# Patient Record
Sex: FEMALE | Race: OTHER | NOT HISPANIC OR LATINO
[De-identification: names, ages, dates, MRNs, and addresses within clinical notes are randomized per-mention and may not be internally consistent; named-entity substitution may affect disease eponyms.]

---

## 2018-06-04 ENCOUNTER — APPOINTMENT (OUTPATIENT)
Dept: GASTROENTEROLOGY | Facility: CLINIC | Age: 23
End: 2018-06-04
Payer: COMMERCIAL

## 2018-06-04 VITALS
WEIGHT: 125 LBS | BODY MASS INDEX: 20.09 KG/M2 | SYSTOLIC BLOOD PRESSURE: 102 MMHG | OXYGEN SATURATION: 99 % | HEART RATE: 90 BPM | HEIGHT: 66 IN | DIASTOLIC BLOOD PRESSURE: 70 MMHG

## 2018-06-04 DIAGNOSIS — Z78.9 OTHER SPECIFIED HEALTH STATUS: ICD-10-CM

## 2018-06-04 DIAGNOSIS — R10.9 UNSPECIFIED ABDOMINAL PAIN: ICD-10-CM

## 2018-06-04 DIAGNOSIS — R11.0 NAUSEA: ICD-10-CM

## 2018-06-04 PROCEDURE — 99204 OFFICE O/P NEW MOD 45 MIN: CPT

## 2018-06-05 LAB
ALBUMIN SERPL ELPH-MCNC: 4.6 G/DL
ALP BLD-CCNC: 46 U/L
ALT SERPL-CCNC: 15 U/L
ANION GAP SERPL CALC-SCNC: 12 MMOL/L
AST SERPL-CCNC: 29 U/L
BASOPHILS # BLD AUTO: 0.01 K/UL
BASOPHILS NFR BLD AUTO: 0.1 %
BILIRUB SERPL-MCNC: 0.2 MG/DL
BUN SERPL-MCNC: 15 MG/DL
CALCIUM SERPL-MCNC: 9.4 MG/DL
CHLORIDE SERPL-SCNC: 103 MMOL/L
CO2 SERPL-SCNC: 25 MMOL/L
CREAT SERPL-MCNC: 0.87 MG/DL
EOSINOPHIL # BLD AUTO: 0.16 K/UL
EOSINOPHIL NFR BLD AUTO: 2.2 %
GLUCOSE SERPL-MCNC: 89 MG/DL
HCT VFR BLD CALC: 41.8 %
HGB BLD-MCNC: 13.1 G/DL
IMM GRANULOCYTES NFR BLD AUTO: 0.1 %
LYMPHOCYTES # BLD AUTO: 2.22 K/UL
LYMPHOCYTES NFR BLD AUTO: 30.7 %
MAN DIFF?: NORMAL
MCHC RBC-ENTMCNC: 26.7 PG
MCHC RBC-ENTMCNC: 31.3 GM/DL
MCV RBC AUTO: 85.1 FL
MONOCYTES # BLD AUTO: 0.67 K/UL
MONOCYTES NFR BLD AUTO: 9.3 %
NEUTROPHILS # BLD AUTO: 4.16 K/UL
NEUTROPHILS NFR BLD AUTO: 57.6 %
PLATELET # BLD AUTO: 218 K/UL
POTASSIUM SERPL-SCNC: 4.1 MMOL/L
PROT SERPL-MCNC: 7.6 G/DL
RBC # BLD: 4.91 M/UL
RBC # FLD: 13.9 %
SODIUM SERPL-SCNC: 140 MMOL/L
TSH SERPL-ACNC: 3.04 UIU/ML
TTG IGA SER IA-ACNC: <5 UNITS
TTG IGA SER-ACNC: NEGATIVE
TTG IGG SER IA-ACNC: <5 UNITS
TTG IGG SER IA-ACNC: NEGATIVE
WBC # FLD AUTO: 7.23 K/UL

## 2018-06-06 LAB
ENDOMYSIUM IGA SER QL: NEGATIVE
ENDOMYSIUM IGA TITR SER: NORMAL

## 2019-03-16 ENCOUNTER — EMERGENCY (EMERGENCY)
Facility: HOSPITAL | Age: 24
LOS: 1 days | Discharge: HOME | End: 2019-03-16
Attending: EMERGENCY MEDICINE
Payer: COMMERCIAL

## 2019-03-16 VITALS
OXYGEN SATURATION: 98 % | DIASTOLIC BLOOD PRESSURE: 81 MMHG | HEIGHT: 63 IN | SYSTOLIC BLOOD PRESSURE: 115 MMHG | WEIGHT: 110.01 LBS | TEMPERATURE: 98 F | RESPIRATION RATE: 16 BRPM | HEART RATE: 78 BPM

## 2019-03-16 PROCEDURE — 99282 EMERGENCY DEPT VISIT SF MDM: CPT

## 2019-03-16 PROCEDURE — 99053 MED SERV 10PM-8AM 24 HR FAC: CPT

## 2019-03-16 PROCEDURE — 99282 EMERGENCY DEPT VISIT SF MDM: CPT | Mod: 25

## 2019-03-16 NOTE — ED PROVIDER NOTE - ATTENDING CONTRIBUTION TO CARE
ATTENDING MD:  I, Jamir Salguero, personally have seen and examined this patient.  I have discussed all aspects of care with the resident physician. Resident note reviewed and agree on plan of care and except where noted.  See HPI, PE, and MDM for details.    well appearing, NAD. Normal heart rate. normal respiratory rate and effort. L-hand wound is C/D/I.     MDM: screening labs drawn for patient. Declining PEP at this time. Her rapid HIV neg. Source patient results pending. Asking to return to work pending results. Will start PEP if source positive. Lab says they will call me with results.

## 2019-03-16 NOTE — ED PROVIDER NOTE - OBJECTIVE STATEMENT
23F no pmh UTD with vaccines p/w finger stick. Patient is MICU nurse. Accidentally stuck her R first finger with butterfly needle while doing ABG. Unknown infectious status of source patient. Feels well otherwise. Does not want PEP

## 2019-03-16 NOTE — ED ADULT NURSE NOTE - OBJECTIVE STATEMENT
Patient presented to ED after a needle stick. 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 Patient had a pinpoint  puncture and stated she washed her hands throughout.

## 2019-03-16 NOTE — ED PROVIDER NOTE - SKIN, MLM
Skin normal color for race, warm, dry and intact. No evidence of rash. non bleeding, small needle injury to R first finger. no obvious local infectious signs.

## 2019-03-16 NOTE — ED ADULT NURSE NOTE - NSIMPLEMENTINTERV_GEN_ALL_ED
Implemented All Universal Safety Interventions:  [place] to call system. Call bell, personal items and telephone within reach. Instruct patient to call for assistance. Room bathroom lighting operational. Non-slip footwear when patient is off stretcher. Physically safe environment: no spills, clutter or unnecessary equipment. Stretcher in lowest position, wheels locked, appropriate side rails in place.

## 2019-03-16 NOTE — ED PROVIDER NOTE - CLINICAL SUMMARY MEDICAL DECISION MAKING FREE TEXT BOX
23F no pmh p/w needle stick. utd with vaccinations. does not want PEP. Will follow exposure protocol and have patient f/u with employee health.

## 2019-05-09 ENCOUNTER — RESULT REVIEW (OUTPATIENT)
Age: 24
End: 2019-05-09

## 2019-06-05 ENCOUNTER — RX RENEWAL (OUTPATIENT)
Age: 24
End: 2019-06-05

## 2019-09-06 ENCOUNTER — MEDICATION RENEWAL (OUTPATIENT)
Age: 24
End: 2019-09-06

## 2019-09-18 ENCOUNTER — MEDICATION RENEWAL (OUTPATIENT)
Age: 24
End: 2019-09-18

## 2019-09-25 ENCOUNTER — MEDICATION RENEWAL (OUTPATIENT)
Age: 24
End: 2019-09-25

## 2020-04-25 ENCOUNTER — MESSAGE (OUTPATIENT)
Age: 25
End: 2020-04-25

## 2020-05-03 LAB
SARS-COV-2 IGG SERPL IA-ACNC: <0.1 INDEX
SARS-COV-2 IGG SERPL QL IA: NEGATIVE

## 2020-09-16 ENCOUNTER — RX RENEWAL (OUTPATIENT)
Age: 25
End: 2020-09-16

## 2020-11-05 ENCOUNTER — NON-APPOINTMENT (OUTPATIENT)
Age: 25
End: 2020-11-05

## 2021-03-10 ENCOUNTER — APPOINTMENT (OUTPATIENT)
Dept: GASTROENTEROLOGY | Facility: CLINIC | Age: 26
End: 2021-03-10
Payer: COMMERCIAL

## 2021-03-10 VITALS
BODY MASS INDEX: 20.57 KG/M2 | HEIGHT: 66 IN | TEMPERATURE: 98.7 F | DIASTOLIC BLOOD PRESSURE: 70 MMHG | OXYGEN SATURATION: 99 % | WEIGHT: 128 LBS | SYSTOLIC BLOOD PRESSURE: 110 MMHG | HEART RATE: 88 BPM

## 2021-03-10 PROCEDURE — 99213 OFFICE O/P EST LOW 20 MIN: CPT

## 2021-03-10 PROCEDURE — 99072 ADDL SUPL MATRL&STAF TM PHE: CPT

## 2021-03-10 NOTE — ASSESSMENT
[FreeTextEntry1] : This is a 25-year-old female with history of chronic GERD and irritable bowel syndrome.  She states that ever since she modified her diet eating less acidic, spicy and caffeinated foods and beverages her heartburn and IBS has improved.  She was able to come off omeprazole and is taking over-the-counter Pepcid 20 mg as needed with good response.  She denies dysphagia or odynophagia.  She hardly has abdominal pain or changes in bowel habits since she modified her diet.  She states that she also has less stress at work.  Overall she feels well.  I recommend famotidine 20 mg as needed for GERD symptoms.  I recommend eating small meals and avoid laying down after eating.  She is to call me if she has any questions or concerns otherwise I will see her for follow-up in 1 year.

## 2021-05-24 ENCOUNTER — APPOINTMENT (OUTPATIENT)
Dept: INTERNAL MEDICINE | Facility: CLINIC | Age: 26
End: 2021-05-24
Payer: MEDICARE

## 2021-05-24 VITALS
WEIGHT: 133 LBS | DIASTOLIC BLOOD PRESSURE: 70 MMHG | BODY MASS INDEX: 22.16 KG/M2 | HEIGHT: 65 IN | SYSTOLIC BLOOD PRESSURE: 108 MMHG | HEART RATE: 88 BPM | OXYGEN SATURATION: 97 %

## 2021-05-24 DIAGNOSIS — R00.0 TACHYCARDIA, UNSPECIFIED: ICD-10-CM

## 2021-05-24 DIAGNOSIS — Z83.49 FAMILY HISTORY OF OTHER ENDOCRINE, NUTRITIONAL AND METABOLIC DISEASES: ICD-10-CM

## 2021-05-24 DIAGNOSIS — L81.8 OTHER SPECIFIED DISORDERS OF PIGMENTATION: ICD-10-CM

## 2021-05-24 PROCEDURE — G0444 DEPRESSION SCREEN ANNUAL: CPT | Mod: 59

## 2021-05-24 PROCEDURE — 99385 PREV VISIT NEW AGE 18-39: CPT | Mod: GY

## 2021-05-24 PROCEDURE — 99072 ADDL SUPL MATRL&STAF TM PHE: CPT

## 2021-05-24 RX ORDER — MULTIVITAMIN
CAPSULE ORAL
Refills: 0 | Status: ACTIVE | COMMUNITY

## 2021-05-24 RX ORDER — ACETAMINOPHEN 325 MG
TABLET ORAL
Refills: 0 | Status: ACTIVE | COMMUNITY

## 2021-05-24 NOTE — ASSESSMENT
[FreeTextEntry1] : 1.  General health maintenance -reports that Pap smear is up-to-date.  Immunizations are up-to-date.  Uses sunscreen, seatbelts etc.\par 2.  History of anemia and complaints of easy bruising but only with trauma.  We will check a CBC.\par 3.  History of GERD -has seen GI in the past.  Uses famotidine and when it is bad will then take a PPI for 3 to 4 days.\par 4.  Complaints of hair loss -tug test was negative.  Has been under a great deal of stress with the pandemic and she is an ICU nurse and works nights as well.  Suspect stress related but will check to make sure she is not anemic or her thyroid is functioning correctly.\par 5.  Labs as per plan\par 6.  Complaints of fast heart rate with exercise or walking.  Has been following and it is actually been a little better.  Suggested that she stay well-hydrated.  Will check TFTs to rule out any abnormality.\par 7.  Follow-up in 1 year or as needed

## 2021-05-24 NOTE — HISTORY OF PRESENT ILLNESS
[de-identified] : Healthy 24 yo female here to [facility].  Has no specific complaints.  Tends to run a high heart rate with exercise - can sometimes go to 190.  She has been paying attention to this as she has an apple watch.  Walking her heart rate can go into the 120'a.  Tends to not drink enough fluids.  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  She als has a h/o anemia.  Sees GYN and usually gets her CBC done but does not know the number\par \par Bruises easily with minor trauma but she reports no spontaneous bruising.  Periods are heavy the first 2 days - can go through 6 super tampons in a day.\par \par Had Pfizer back in Dec and Jan - the latter on 1/12

## 2021-05-24 NOTE — PHYSICAL EXAM
[No Acute Distress] : no acute distress [Well Nourished] : well nourished [Well Developed] : well developed [Well-Appearing] : well-appearing [Normal Sclera/Conjunctiva] : normal sclera/conjunctiva [PERRL] : pupils equal round and reactive to light [EOMI] : extraocular movements intact [Normal Outer Ear/Nose] : the outer ears and nose were normal in appearance [No JVD] : no jugular venous distention [No Lymphadenopathy] : no lymphadenopathy [Supple] : supple [Thyroid Normal, No Nodules] : the thyroid was normal and there were no nodules present [No Respiratory Distress] : no respiratory distress  [No Accessory Muscle Use] : no accessory muscle use [Clear to Auscultation] : lungs were clear to auscultation bilaterally [Normal Rate] : normal rate  [Regular Rhythm] : with a regular rhythm [Normal S1, S2] : normal S1 and S2 [No Murmur] : no murmur heard [No Carotid Bruits] : no carotid bruits [No Abdominal Bruit] : a ~M bruit was not heard ~T in the abdomen [No Varicosities] : no varicosities [Pedal Pulses Present] : the pedal pulses are present [No Edema] : there was no peripheral edema [No Palpable Aorta] : no palpable aorta [No Extremity Clubbing/Cyanosis] : no extremity clubbing/cyanosis [Normal Appearance] : normal in appearance [No Nipple Discharge] : no nipple discharge [Soft] : abdomen soft [Non Tender] : non-tender [Non-distended] : non-distended [No Masses] : no abdominal mass palpated [No HSM] : no HSM [Normal Bowel Sounds] : normal bowel sounds [Normal Posterior Cervical Nodes] : no posterior cervical lymphadenopathy [Normal Anterior Cervical Nodes] : no anterior cervical lymphadenopathy [No CVA Tenderness] : no CVA  tenderness [No Spinal Tenderness] : no spinal tenderness [No Joint Swelling] : no joint swelling [Grossly Normal Strength/Tone] : grossly normal strength/tone [No Rash] : no rash [Coordination Grossly Intact] : coordination grossly intact [No Focal Deficits] : no focal deficits [Normal Gait] : normal gait [Deep Tendon Reflexes (DTR)] : deep tendon reflexes were 2+ and symmetric [Normal Affect] : the affect was normal [Normal Insight/Judgement] : insight and judgment were intact

## 2021-06-13 DIAGNOSIS — E55.9 VITAMIN D DEFICIENCY, UNSPECIFIED: ICD-10-CM

## 2021-06-13 LAB
25(OH)D3 SERPL-MCNC: 20.4 NG/ML
ALBUMIN SERPL ELPH-MCNC: 4.5 G/DL
ALP BLD-CCNC: 46 U/L
ALT SERPL-CCNC: 13 U/L
ANION GAP SERPL CALC-SCNC: 11 MMOL/L
AST SERPL-CCNC: 20 U/L
BASOPHILS # BLD AUTO: 0.05 K/UL
BASOPHILS NFR BLD AUTO: 0.9 %
BILIRUB SERPL-MCNC: 0.2 MG/DL
BUN SERPL-MCNC: 12 MG/DL
CALCIUM SERPL-MCNC: 9.5 MG/DL
CHLORIDE SERPL-SCNC: 103 MMOL/L
CHOLEST SERPL-MCNC: 161 MG/DL
CO2 SERPL-SCNC: 25 MMOL/L
CREAT SERPL-MCNC: 0.7 MG/DL
EOSINOPHIL # BLD AUTO: 0.14 K/UL
EOSINOPHIL NFR BLD AUTO: 2.5 %
ESTIMATED AVERAGE GLUCOSE: 108 MG/DL
GLUCOSE SERPL-MCNC: 75 MG/DL
HBA1C MFR BLD HPLC: 5.4 %
HCT VFR BLD CALC: 37.5 %
HDLC SERPL-MCNC: 63 MG/DL
HGB BLD-MCNC: 11 G/DL
IMM GRANULOCYTES NFR BLD AUTO: 0.4 %
LDLC SERPL CALC-MCNC: 85 MG/DL
LYMPHOCYTES # BLD AUTO: 1.75 K/UL
LYMPHOCYTES NFR BLD AUTO: 31.2 %
MAN DIFF?: NORMAL
MCHC RBC-ENTMCNC: 23.5 PG
MCHC RBC-ENTMCNC: 29.3 GM/DL
MCV RBC AUTO: 80 FL
MONOCYTES # BLD AUTO: 0.6 K/UL
MONOCYTES NFR BLD AUTO: 10.7 %
NEUTROPHILS # BLD AUTO: 3.05 K/UL
NEUTROPHILS NFR BLD AUTO: 54.3 %
NONHDLC SERPL-MCNC: 97 MG/DL
PLATELET # BLD AUTO: 258 K/UL
POTASSIUM SERPL-SCNC: 4.7 MMOL/L
PROT SERPL-MCNC: 7.1 G/DL
RBC # BLD: 4.69 M/UL
RBC # FLD: 15.1 %
SODIUM SERPL-SCNC: 139 MMOL/L
T4 FREE SERPL-MCNC: 1.4 NG/DL
TRIGL SERPL-MCNC: 63 MG/DL
TSH SERPL-ACNC: 3.33 UIU/ML
WBC # FLD AUTO: 5.61 K/UL

## 2022-04-20 ENCOUNTER — APPOINTMENT (OUTPATIENT)
Dept: GASTROENTEROLOGY | Facility: CLINIC | Age: 27
End: 2022-04-20
Payer: COMMERCIAL

## 2022-04-20 VITALS
BODY MASS INDEX: 21.33 KG/M2 | HEIGHT: 65 IN | OXYGEN SATURATION: 97 % | HEART RATE: 80 BPM | DIASTOLIC BLOOD PRESSURE: 70 MMHG | WEIGHT: 128 LBS | SYSTOLIC BLOOD PRESSURE: 100 MMHG

## 2022-04-20 DIAGNOSIS — K64.4 RESIDUAL HEMORRHOIDAL SKIN TAGS: ICD-10-CM

## 2022-04-20 DIAGNOSIS — K58.2 MIXED IRRITABLE BOWEL SYNDROME: ICD-10-CM

## 2022-04-20 PROCEDURE — 99214 OFFICE O/P EST MOD 30 MIN: CPT

## 2022-04-20 NOTE — REVIEW OF SYSTEMS
[Fever] : no fever [Chills] : no chills [Chest Pain] : no chest pain [Palpitations] : no palpitations [Shortness Of Breath] : no shortness of breath [Cough] : no cough [Abdominal Pain] : no abdominal pain [Vomiting] : no vomiting [Constipation] : constipation [Diarrhea] : no diarrhea [Heartburn] : heartburn [Melena] : no melena [Negative] : Integumentary

## 2022-04-20 NOTE — PHYSICAL EXAM
[General Appearance - Alert] : alert [General Appearance - In No Acute Distress] : in no acute distress [Sclera] : the sclera and conjunctiva were normal [Outer Ear] : the ears and nose were normal in appearance [Neck Appearance] : the appearance of the neck was normal [] : no respiratory distress [Respiration, Rhythm And Depth] : normal respiratory rhythm and effort [Heart Rate And Rhythm] : heart rate was normal and rhythm regular [Bowel Sounds] : normal bowel sounds [Abdomen Soft] : soft [Abdomen Tenderness] : non-tender [Skin Color & Pigmentation] : normal skin color and pigmentation [No Focal Deficits] : no focal deficits [Oriented To Time, Place, And Person] : oriented to person, place, and time

## 2022-04-20 NOTE — HISTORY OF PRESENT ILLNESS
[de-identified] : Dayami Polanco is a 26 year old female with no PMH presents today for yearly follow up for GERD and IBS. Pt reports over the last few weeks has been having increased episodes of GERD, can attribute it to a recent change in diet and increase in stress. Reports her symptoms mainly consist of epigastric burning and metallic taste in her mouth particularly worse in the AM. Also notes that she ran out of famotidine several weeks ago. Denies any regurgitation, dysphagia, odynophagia, unintentional weight loss. States that she has BMs usually every other day with mild straining, eats prunes when she feels particularly constipated that help her pass a BM. Denies blood in stool. Reports last week had a likley viral illness associated with fever and one episode of vomiting that resolved after 2 days on its own, reports following episode of vomiting experienced upper abdominal discomfort that has begun to improve over the last few days. Also reports that with her menstrual cycle she sometimes experiences rectal discomfort.

## 2022-04-20 NOTE — ASSESSMENT
[FreeTextEntry1] : Dayami Polanco is a 26 year old female with no PMH presents today for yearly follow up for GERD and IBS. Pt reports over the last few weeks has been having increased episodes of GERD, can attribute it to a recent change in diet and increase in stress. Reports her symptoms mainly consist of epigastric burning and metallic taste in her mouth particularly worse in the AM. Also notes that she ran out of famotidine several weeks ago. Pt states in the past Dr. Templeton informed her we can do short trial of PPI then resume Famotidine 20MG BID to control GERD symptoms, pt requests to do this. Instructed pt to take omeprazole 20MG daily for 2 weeks, then resume pecid 20MG BID as previously instructed, pt agrees. States that she has BMs usually every other day with mild straining, eats prunes when she feels particularly constipated that help her pass a BM, recommend continuing this.  Gave pt instructions on increased fiber diet as well as fiber supplements such as benefiber or metamucil, but pt prefers to use prunes to help her with constipation. Reports last week had a likley viral illness associated with fever and one episode of vomiting that resolved after 2 days on its own, reports following episode of vomiting experienced upper abdominal discomfort that has begun to improve over the last few days. Likely MSK of nature, informed pt she can take tylenol or motrin as needed, no interventions at this time since pain is improving. Pt also reports during menstrual cycle she sometimes experiences rectal discomfort, recommend OTC hemorrhoid cream such as proctosol for rectal discomfort, may be r/t previously noted hemorrhoids. All questions answered, pt agrees to plan, instructed to follow up yearly or sooner for additional complaints.

## 2022-04-22 ENCOUNTER — TRANSCRIPTION ENCOUNTER (OUTPATIENT)
Age: 27
End: 2022-04-22

## 2022-04-29 ENCOUNTER — NON-APPOINTMENT (OUTPATIENT)
Age: 27
End: 2022-04-29

## 2022-12-14 ENCOUNTER — APPOINTMENT (OUTPATIENT)
Dept: INTERNAL MEDICINE | Facility: CLINIC | Age: 27
End: 2022-12-14

## 2022-12-14 VITALS
DIASTOLIC BLOOD PRESSURE: 74 MMHG | HEART RATE: 70 BPM | HEIGHT: 65 IN | BODY MASS INDEX: 23.99 KG/M2 | WEIGHT: 144 LBS | OXYGEN SATURATION: 98 % | SYSTOLIC BLOOD PRESSURE: 108 MMHG

## 2022-12-14 DIAGNOSIS — Z13.31 ENCOUNTER FOR SCREENING FOR DEPRESSION: ICD-10-CM

## 2022-12-14 DIAGNOSIS — R23.8 OTHER SKIN CHANGES: ICD-10-CM

## 2022-12-14 DIAGNOSIS — K21.9 GASTRO-ESOPHAGEAL REFLUX DISEASE W/OUT ESOPHAGITIS: ICD-10-CM

## 2022-12-14 DIAGNOSIS — D64.9 ANEMIA, UNSPECIFIED: ICD-10-CM

## 2022-12-14 PROCEDURE — G0444 DEPRESSION SCREEN ANNUAL: CPT | Mod: 59

## 2022-12-14 PROCEDURE — 99395 PREV VISIT EST AGE 18-39: CPT

## 2022-12-14 NOTE — HISTORY OF PRESENT ILLNESS
[de-identified] : Healthy 26 yo female here for her comprehensive evaluation.  Has no specific complaints.  Just got  this September - went to [place] on her honeymoon.  Has gained some weight and will be working on eating healthier.    She also has a h/o anemia. \par \par Bruises easily with minor trauma but she reports no spontaneous bruising.  Periods are heavy the first 2 days - can go through 6 super tampons in a day.\par \par Had Pfizer back in Dec and Jan [id number].    Had one COVID booster.  Had a flu shot in October of this year\par \par GERD is much better since going to days.  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  Taking pepcid in the morning only.

## 2022-12-14 NOTE — PHYSICAL EXAM
[No Acute Distress] : no acute distress [Well Nourished] : well nourished [Well Developed] : well developed [Well-Appearing] : well-appearing [Normal Sclera/Conjunctiva] : normal sclera/conjunctiva [PERRL] : pupils equal round and reactive to light [EOMI] : extraocular movements intact [Normal Outer Ear/Nose] : the outer ears and nose were normal in appearance [No JVD] : no jugular venous distention [No Lymphadenopathy] : no lymphadenopathy [Supple] : supple [No Respiratory Distress] : no respiratory distress  [No Accessory Muscle Use] : no accessory muscle use [Clear to Auscultation] : lungs were clear to auscultation bilaterally [Normal Rate] : normal rate  [Regular Rhythm] : with a regular rhythm [Normal S1, S2] : normal S1 and S2 [No Murmur] : no murmur heard [No Carotid Bruits] : no carotid bruits [No Abdominal Bruit] : a ~M bruit was not heard ~T in the abdomen [No Varicosities] : no varicosities [Pedal Pulses Present] : the pedal pulses are present [No Edema] : there was no peripheral edema [No Palpable Aorta] : no palpable aorta [No Extremity Clubbing/Cyanosis] : no extremity clubbing/cyanosis [Normal Appearance] : normal in appearance [No Nipple Discharge] : no nipple discharge [Soft] : abdomen soft [Non Tender] : non-tender [Non-distended] : non-distended [No Masses] : no abdominal mass palpated [No HSM] : no HSM [Normal Bowel Sounds] : normal bowel sounds [Normal Posterior Cervical Nodes] : no posterior cervical lymphadenopathy [Normal Anterior Cervical Nodes] : no anterior cervical lymphadenopathy [No CVA Tenderness] : no CVA  tenderness [No Spinal Tenderness] : no spinal tenderness [No Joint Swelling] : no joint swelling [Grossly Normal Strength/Tone] : grossly normal strength/tone [No Rash] : no rash [Coordination Grossly Intact] : coordination grossly intact [No Focal Deficits] : no focal deficits [Normal Gait] : normal gait [Deep Tendon Reflexes (DTR)] : deep tendon reflexes were 2+ and symmetric [Normal Affect] : the affect was normal [Normal Insight/Judgement] : insight and judgment were intact [de-identified] : thyroid is soft but full

## 2022-12-14 NOTE — ASSESSMENT
[FreeTextEntry1] : 1.  General health maintenance -reports that Pap smear is up-to-date.  Immunizations are up-to-date.  Uses sunscreen, seatbelts etc.\par 2.  History of anemia and complaints of easy bruising but only with trauma.  We will check a CBC, coags and iron studies\par 3.  History of GERD -has seen GI in the past.  Uses famotidine and when it is bad will then take a PPI for 3 to 4 days.\par 4.  Complaints of hair thinning with thyroid fullness on exam.  Mom with thyroid issues.  Will check TFT's and a thyroid US.\par 5.  Labs as per plan\par 6.  Facial pressure on right - trial of flonase\par 7.  Follow-up in 1 year or as needed

## 2022-12-14 NOTE — HEALTH RISK ASSESSMENT
[Never] : Never [No falls in past year] : Patient reported no falls in the past year [0] : 2) Feeling down, depressed, or hopeless: Not at all (0) [PHQ-2 Negative - No further assessment needed] : PHQ-2 Negative - No further assessment needed [Patient reported PAP Smear was normal] : Patient reported PAP Smear was normal [HIV test declined] : HIV test declined [Hepatitis C test declined] : Hepatitis C test declined [None] : None [With Significant Other] : lives with significant other [Employed] : employed [] :  [Fully functional (bathing, dressing, toileting, transferring, walking, feeding)] : Fully functional (bathing, dressing, toileting, transferring, walking, feeding) [Fully functional (using the telephone, shopping, preparing meals, housekeeping, doing laundry, using] : Fully functional and needs no help or supervision to perform IADLs (using the telephone, shopping, preparing meals, housekeeping, doing laundry, using transportation, managing medications and managing finances) [Patient/Caregiver not ready to engage] : , patient/caregiver not ready to engage [de-identified] : occ [de-identified] : Exercising a little more now [[id number]] : 0 [Change in mental status noted] : No change in mental status noted [Language] : denies difficulty with language [Behavior] : denies difficulty with behavior [Learning/Retaining New Information] : denies difficulty learning/retaining new information [Handling Complex Tasks] : denies difficulty handling complex tasks [Reasoning] : denies difficulty with reasoning [Spatial Ability and Orientation] : denies difficulty with spatial ability and orientation [PapSmearDate] : 11/21 [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

## 2022-12-18 LAB
ALBUMIN SERPL ELPH-MCNC: 4.8 G/DL
ALP BLD-CCNC: 56 U/L
ALT SERPL-CCNC: 37 U/L
ANION GAP SERPL CALC-SCNC: 14 MMOL/L
APTT BLD: 31.3 SEC
AST SERPL-CCNC: 54 U/L
BASOPHILS # BLD AUTO: 0.05 K/UL
BASOPHILS NFR BLD AUTO: 1 %
BILIRUB SERPL-MCNC: 0.2 MG/DL
BUN SERPL-MCNC: 13 MG/DL
CALCIUM SERPL-MCNC: 9.5 MG/DL
CHLORIDE SERPL-SCNC: 102 MMOL/L
CHOLEST SERPL-MCNC: 178 MG/DL
CO2 SERPL-SCNC: 22 MMOL/L
CREAT SERPL-MCNC: 0.73 MG/DL
EGFR: 116 ML/MIN/1.73M2
EOSINOPHIL # BLD AUTO: 0.18 K/UL
EOSINOPHIL NFR BLD AUTO: 3.7 %
ESTIMATED AVERAGE GLUCOSE: 114 MG/DL
FERRITIN SERPL-MCNC: 10 NG/ML
GLUCOSE SERPL-MCNC: 83 MG/DL
HBA1C MFR BLD HPLC: 5.6 %
HCT VFR BLD CALC: 43.4 %
HDLC SERPL-MCNC: 56 MG/DL
HGB BLD-MCNC: 13.8 G/DL
IMM GRANULOCYTES NFR BLD AUTO: 0.2 %
INR PPP: 0.99 RATIO
IRON SATN MFR SERPL: 14 %
IRON SERPL-MCNC: 55 UG/DL
LDLC SERPL CALC-MCNC: 110 MG/DL
LYMPHOCYTES # BLD AUTO: 1.76 K/UL
LYMPHOCYTES NFR BLD AUTO: 36.5 %
MAN DIFF?: NORMAL
MCHC RBC-ENTMCNC: 25.2 PG
MCHC RBC-ENTMCNC: 31.8 GM/DL
MCV RBC AUTO: 79.3 FL
MONOCYTES # BLD AUTO: 0.43 K/UL
MONOCYTES NFR BLD AUTO: 8.9 %
NEUTROPHILS # BLD AUTO: 2.39 K/UL
NEUTROPHILS NFR BLD AUTO: 49.7 %
NONHDLC SERPL-MCNC: 122 MG/DL
PLATELET # BLD AUTO: 240 K/UL
POTASSIUM SERPL-SCNC: 4.6 MMOL/L
PROT SERPL-MCNC: 7.6 G/DL
PT BLD: 11.5 SEC
RBC # BLD: 5.47 M/UL
RBC # FLD: 14 %
SODIUM SERPL-SCNC: 138 MMOL/L
T4 FREE SERPL-MCNC: 1.3 NG/DL
TIBC SERPL-MCNC: 389 UG/DL
TRIGL SERPL-MCNC: 58 MG/DL
TSH SERPL-ACNC: 2.31 UIU/ML
UIBC SERPL-MCNC: 334 UG/DL
WBC # FLD AUTO: 4.82 K/UL

## 2022-12-22 ENCOUNTER — OUTPATIENT (OUTPATIENT)
Dept: OUTPATIENT SERVICES | Facility: HOSPITAL | Age: 27
LOS: 1 days | End: 2022-12-22
Payer: COMMERCIAL

## 2022-12-22 ENCOUNTER — APPOINTMENT (OUTPATIENT)
Dept: ULTRASOUND IMAGING | Facility: HOSPITAL | Age: 27
End: 2022-12-22

## 2022-12-22 DIAGNOSIS — E07.89 OTHER SPECIFIED DISORDERS OF THYROID: ICD-10-CM

## 2022-12-22 PROCEDURE — 76536 US EXAM OF HEAD AND NECK: CPT | Mod: 26

## 2022-12-22 PROCEDURE — 76536 US EXAM OF HEAD AND NECK: CPT

## 2023-03-09 DIAGNOSIS — R79.89 OTHER SPECIFIED ABNORMAL FINDINGS OF BLOOD CHEMISTRY: ICD-10-CM

## 2023-03-10 ENCOUNTER — RESULT REVIEW (OUTPATIENT)
Age: 28
End: 2023-03-10

## 2023-03-16 ENCOUNTER — APPOINTMENT (OUTPATIENT)
Dept: ENDOCRINOLOGY | Facility: CLINIC | Age: 28
End: 2023-03-16
Payer: COMMERCIAL

## 2023-03-16 VITALS
BODY MASS INDEX: 23.99 KG/M2 | WEIGHT: 144 LBS | HEIGHT: 65 IN | HEART RATE: 104 BPM | DIASTOLIC BLOOD PRESSURE: 82 MMHG | SYSTOLIC BLOOD PRESSURE: 123 MMHG | OXYGEN SATURATION: 99 %

## 2023-03-16 DIAGNOSIS — R63.5 ABNORMAL WEIGHT GAIN: ICD-10-CM

## 2023-03-16 PROCEDURE — 99204 OFFICE O/P NEW MOD 45 MIN: CPT

## 2023-03-16 NOTE — ASSESSMENT
[FreeTextEntry1] : 26 yo F with PMH of GERD, IBS, here with concern for PCOS and hair thinning.\par \par #Hair thinning - at part/vertex, c/w female pattern hair loss, ddx includes hyperandrogenism\par - pt will send me labs from GYN who checked androgens\par - TFTs recently normal\par - recommend dermatology evaluation as well\par - discussed potential treatment with spironolactone - pt would need to be on reliable birth control ie. OCP due to teratogenicity risk\par - can check MNSC x2 to rule out hypercortisolism, low clinical suspicion\par \par #irregular menses - had one late period, possibly in setting of stress. Clinically, sounds like ovulatory, regular cycles and does not meet NIH criteria at this time\par - pt will continue to track cycles\par - will follow up androgen workup\par - TFTs normal. If any further irregularities would also check PRL.\par \par #Weight gain\par - discussed lifestyle - increased exercise, pt also working on mediating stress. Can try yoga, incorporating gentle aerobic exercise and escalate as tolerated.\par - MNSC x2 as above\par \par RTC as needed/if abnormal labs

## 2023-03-16 NOTE — REVIEW OF SYSTEMS
[Fatigue] : fatigue [Recent Weight Gain (___ Lbs)] : recent weight gain: [unfilled] lbs [Negative] : Endocrine

## 2023-03-16 NOTE — PHYSICAL EXAM
[Alert] : alert [Well Nourished] : well nourished [No Acute Distress] : no acute distress [Well Developed] : well developed [Normal Sclera/Conjunctiva] : normal sclera/conjunctiva [EOMI] : extra ocular movement intact [No Proptosis] : no proptosis [Normal Oropharynx] : the oropharynx was normal [Supple] : the neck was supple [Thyroid Not Enlarged] : the thyroid was not enlarged [No Thyroid Nodules] : no palpable thyroid nodules [No Respiratory Distress] : no respiratory distress [No Accessory Muscle Use] : no accessory muscle use [Normal Rate and Effort] : normal respiratory rate and effort [Clear to Auscultation] : lungs were clear to auscultation bilaterally [Normal S1, S2] : normal S1 and S2 [Normal Rate] : heart rate was normal [Regular Rhythm] : with a regular rhythm [No Edema] : no peripheral edema [Pedal Pulses Normal] : the pedal pulses are present [Normal Bowel Sounds] : normal bowel sounds [Not Tender] : non-tender [Not Distended] : not distended [Soft] : abdomen soft [Normal Anterior Cervical Nodes] : no anterior cervical lymphadenopathy [Normal Posterior Cervical Nodes] : no posterior cervical lymphadenopathy [No Spinal Tenderness] : no spinal tenderness [Spine Straight] : spine straight [No Stigmata of Cushings Syndrome] : no stigmata of Cushings Syndrome [Normal Gait] : normal gait [No Rash] : no rash [Normal Strength/Tone] : muscle strength and tone were normal [Abdominal Striae] : no abdominal striae [Acne] : no acne [Acanthosis Nigricans] : no acanthosis nigricans [Hirsutism] : no hirsutism [Normal Reflexes] : deep tendon reflexes were 2+ and symmetric [No Tremors] : no tremors [Oriented x3] : oriented to person, place, and time [de-identified] : hair thinning at part and thinning at vertex

## 2023-03-16 NOTE — HISTORY OF PRESENT ILLNESS
[FreeTextEntry1] : 28 yo F with PMH of GERD, IBS, here with concern for PCOS and hair thinning.\par \par A1c was 5.4 -> 5.6% \par Reports gaining weight in the last few years.\par Missed period in 11/2022, had longer cycle at 47 days. Saw GYN recently. Was concerned about PCOS. Had androgens sent.\par Has transvaginal US scheduled.\par Was never told she had PCOS previously. \par Reports fine hair and 3 years of hair thinning and hair falling out more in the shower.\par Periods starting to be heavier. \par Reports a lot of stressors.\par Not exercising as much as she used to. \par Not trying for pregnancy now. Started to track cycles. \par \par Menarche: 12\par Menses per year: monthly\par cycles 32-35 days; due today, spotting \par reports heavier periods \par Never on OCP before, not on now \par [place]: Breast tenderness + before period by 2 weeks;	Weight gain/bloating + all the time, not just before period;  Mood changes + before period onset\par [place] ethinicity\par Has done dermoplaning; feels like extra hair on face; no dark terminal hair; some faint hair over lip - weekly shave.\par Has some hair thinning at part and posterior scalp\par \par Has never been on spironolactone or metformin.\par \par Family History: no PCOS.\par Mom has hypothyroidism\par \par No purple stretch marks.\par Gained 10 lbs since last year.\par Works days at [employer].\par \par 12/2022 TSH 2.31, FT4 1.3\par \par 12/2022 thyroid US normal. no nodules\par \par RN in MICU

## 2023-03-27 ENCOUNTER — TRANSCRIPTION ENCOUNTER (OUTPATIENT)
Age: 28
End: 2023-03-27

## 2023-03-27 ENCOUNTER — NON-APPOINTMENT (OUTPATIENT)
Age: 28
End: 2023-03-27

## 2023-03-27 LAB
CORTIS SAL-MCNC: NORMAL
CORTIS SAL-MCNC: NORMAL

## 2023-04-03 ENCOUNTER — TRANSCRIPTION ENCOUNTER (OUTPATIENT)
Age: 28
End: 2023-04-03

## 2023-04-05 ENCOUNTER — TRANSCRIPTION ENCOUNTER (OUTPATIENT)
Age: 28
End: 2023-04-05

## 2023-07-20 PROBLEM — R79.89 HIGH SERUM 17-HYDROXYPROGESTERONE: Status: ACTIVE | Noted: 2023-07-20

## 2023-08-18 ENCOUNTER — NON-APPOINTMENT (OUTPATIENT)
Age: 28
End: 2023-08-18

## 2023-08-18 ENCOUNTER — TRANSCRIPTION ENCOUNTER (OUTPATIENT)
Age: 28
End: 2023-08-18

## 2023-08-18 LAB
17OHP SERPL-MCNC: 92 NG/DL
ANDROST SERPL-MCNC: 361 NG/DL
DHEA-SULFATE, SERUM: 291 UG/DL
MONOMERIC PROLACTIN (ICMA)*: 9.38 NG/ML
PERCENT MACROPROLACTIN: 31 %
PROLACTIN SERPL-MCNC: 13.6 NG/ML
PROLACTIN, SERUM (ICMA)*: 13.6 NG/ML

## 2023-08-24 ENCOUNTER — TRANSCRIPTION ENCOUNTER (OUTPATIENT)
Age: 28
End: 2023-08-24

## 2023-08-25 ENCOUNTER — TRANSCRIPTION ENCOUNTER (OUTPATIENT)
Age: 28
End: 2023-08-25

## 2023-08-31 ENCOUNTER — TRANSCRIPTION ENCOUNTER (OUTPATIENT)
Age: 28
End: 2023-08-31

## 2024-01-03 ENCOUNTER — TRANSCRIPTION ENCOUNTER (OUTPATIENT)
Age: 29
End: 2024-01-03

## 2024-01-05 RX ORDER — FAMOTIDINE 20 MG/1
20 TABLET, FILM COATED ORAL TWICE DAILY
Qty: 180 | Refills: 3 | Status: ACTIVE | COMMUNITY
Start: 2021-03-10 | End: 1900-01-01

## 2024-01-07 ENCOUNTER — RX RENEWAL (OUTPATIENT)
Age: 29
End: 2024-01-07

## 2024-01-10 ENCOUNTER — RX RENEWAL (OUTPATIENT)
Age: 29
End: 2024-01-10

## 2024-01-11 ENCOUNTER — TRANSCRIPTION ENCOUNTER (OUTPATIENT)
Age: 29
End: 2024-01-11

## 2024-03-06 ENCOUNTER — NON-APPOINTMENT (OUTPATIENT)
Age: 29
End: 2024-03-06

## 2024-04-29 ENCOUNTER — TRANSCRIPTION ENCOUNTER (OUTPATIENT)
Age: 29
End: 2024-04-29

## 2024-05-13 ENCOUNTER — NON-APPOINTMENT (OUTPATIENT)
Age: 29
End: 2024-05-13

## 2024-05-22 ENCOUNTER — RESULT REVIEW (OUTPATIENT)
Age: 29
End: 2024-05-22

## 2024-05-28 RX ORDER — SPIRONOLACTONE 50 MG/1
50 TABLET ORAL
Qty: 60 | Refills: 0 | Status: ACTIVE | COMMUNITY
Start: 2023-08-31 | End: 1900-01-01

## 2024-05-30 ENCOUNTER — APPOINTMENT (OUTPATIENT)
Dept: ENDOCRINOLOGY | Facility: CLINIC | Age: 29
End: 2024-05-30
Payer: COMMERCIAL

## 2024-05-30 DIAGNOSIS — L65.9 NONSCARRING HAIR LOSS, UNSPECIFIED: ICD-10-CM

## 2024-05-30 DIAGNOSIS — Z13.1 ENCOUNTER FOR SCREENING FOR DIABETES MELLITUS: ICD-10-CM

## 2024-05-30 DIAGNOSIS — N92.6 IRREGULAR MENSTRUATION, UNSPECIFIED: ICD-10-CM

## 2024-05-30 DIAGNOSIS — E07.89 OTHER SPECIFIED DISORDERS OF THYROID: ICD-10-CM

## 2024-05-30 PROCEDURE — 99214 OFFICE O/P EST MOD 30 MIN: CPT

## 2024-05-30 PROCEDURE — G2211 COMPLEX E/M VISIT ADD ON: CPT

## 2024-05-30 NOTE — REASON FOR VISIT
[Home] : at home, [unfilled] , at the time of the visit. [Medical Office: ([facility])___] : at the medical office located in  [Patient] : the patient [Self] : self [Follow - Up] : a follow-up visit

## 2024-05-31 ENCOUNTER — APPOINTMENT (OUTPATIENT)
Dept: FAMILY MEDICINE | Facility: CLINIC | Age: 29
End: 2024-05-31
Payer: COMMERCIAL

## 2024-05-31 VITALS
SYSTOLIC BLOOD PRESSURE: 111 MMHG | TEMPERATURE: 97.4 F | OXYGEN SATURATION: 99 % | BODY MASS INDEX: 24.49 KG/M2 | RESPIRATION RATE: 16 BRPM | HEIGHT: 65 IN | HEART RATE: 79 BPM | WEIGHT: 147 LBS | DIASTOLIC BLOOD PRESSURE: 78 MMHG

## 2024-05-31 DIAGNOSIS — Z00.00 ENCOUNTER FOR GENERAL ADULT MEDICAL EXAMINATION W/OUT ABNORMAL FINDINGS: ICD-10-CM

## 2024-05-31 PROCEDURE — 99385 PREV VISIT NEW AGE 18-39: CPT

## 2024-05-31 PROCEDURE — 81003 URINALYSIS AUTO W/O SCOPE: CPT | Mod: QW

## 2024-05-31 PROCEDURE — 36415 COLL VENOUS BLD VENIPUNCTURE: CPT

## 2024-05-31 RX ORDER — OMEPRAZOLE 20 MG/1
20 CAPSULE, DELAYED RELEASE ORAL
Qty: 90 | Refills: 3 | Status: DISCONTINUED | COMMUNITY
Start: 2018-06-04 | End: 2024-05-31

## 2024-05-31 RX ORDER — NORETHINDRONE ACETATE AND ETHINYL ESTRADIOL AND FERROUS FUMARATE 1MG-20(21)
1-20 KIT ORAL DAILY
Qty: 4 | Refills: 1 | Status: DISCONTINUED | COMMUNITY
Start: 2023-08-31 | End: 2024-05-31

## 2024-05-31 RX ORDER — OMEPRAZOLE 20 MG/1
20 CAPSULE, DELAYED RELEASE ORAL DAILY
Qty: 30 | Refills: 0 | Status: DISCONTINUED | COMMUNITY
Start: 2022-04-20 | End: 2024-05-31

## 2024-06-06 ENCOUNTER — RX RENEWAL (OUTPATIENT)
Age: 29
End: 2024-06-06

## 2024-06-06 LAB
BASOPHILS # BLD AUTO: 0.04 K/UL
BASOPHILS NFR BLD AUTO: 0.7 %
CHOLEST SERPL-MCNC: 189 MG/DL
EOSINOPHIL # BLD AUTO: 0.09 K/UL
EOSINOPHIL NFR BLD AUTO: 1.6 %
FOLATE SERPL-MCNC: 16.4 NG/ML
HCT VFR BLD CALC: 46.3 %
HDLC SERPL-MCNC: 54 MG/DL
HGB BLD-MCNC: 14.5 G/DL
IMM GRANULOCYTES NFR BLD AUTO: 0.2 %
LDLC SERPL CALC-MCNC: 120 MG/DL
LYMPHOCYTES # BLD AUTO: 1.43 K/UL
LYMPHOCYTES NFR BLD AUTO: 25.3 %
MAGNESIUM SERPL-MCNC: 2.2 MG/DL
MAN DIFF?: NORMAL
MCHC RBC-ENTMCNC: 26.7 PG
MCHC RBC-ENTMCNC: 31.3 GM/DL
MCV RBC AUTO: 85.1 FL
MONOCYTES # BLD AUTO: 0.29 K/UL
MONOCYTES NFR BLD AUTO: 5.1 %
NEUTROPHILS # BLD AUTO: 3.79 K/UL
NEUTROPHILS NFR BLD AUTO: 67.1 %
NONHDLC SERPL-MCNC: 135 MG/DL
PLATELET # BLD AUTO: 247 K/UL
POTASSIUM SERPL-SCNC: 4.5 MMOL/L
RBC # BLD: 5.44 M/UL
RBC # FLD: 13.9 %
TRIGL SERPL-MCNC: 84 MG/DL
TSH SERPL-ACNC: 2.3 UIU/ML
VIT B12 SERPL-MCNC: 501 PG/ML
WBC # FLD AUTO: 5.65 K/UL

## 2024-06-06 RX ORDER — NORETHINDRONE ACETATE AND ETHINYL ESTRADIOL AND FERROUS FUMARATE 1MG-20(21)
1-20 KIT ORAL
Qty: 112 | Refills: 3 | Status: ACTIVE | COMMUNITY
Start: 2024-01-10 | End: 1900-01-01

## 2024-06-07 NOTE — HISTORY OF PRESENT ILLNESS
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  Patient presents for annual physical.  No N/V/D, no recent illness, hospitalizations, no falls or accidents within past year.  -Recent visit with Endocrinology-workup for PCOS-did not experience menstrual period for 3 months in a row, night sweats, and loosing hair-not diagnosed at this time-started on norethindrone/ethinyl estradiol and Spironolactone. May want to start family planning within next year.

## 2024-06-07 NOTE — REVIEW OF SYSTEMS
[Night Sweats] : night sweats [Recent Change In Weight] : ~T recent weight change [Negative] : Heme/Lymph [Fever] : no fever [Chills] : no chills [Fatigue] : no fatigue [Hot Flashes] : no hot flashes

## 2024-06-07 NOTE — PLAN
[FreeTextEntry1] : 5/31/24 Discussed family planning during visit.  Follow up with Endocrinology. Will call with lab results.

## 2024-06-07 NOTE — HEALTH RISK ASSESSMENT
[Good] : ~his/her~  mood as  good [Monthly or less (1 pt)] : Monthly or less (1 point) [1 or 2 (0 pts)] : 1 or 2 (0 points) [Never (0 pts)] : Never (0 points) [No] : In the past 12 months have you used drugs other than those required for medical reasons? No [No falls in past year] : Patient reported no falls in the past year [0] : 2) Feeling down, depressed, or hopeless: Not at all (0) [Never] : Never [None] : None [With Significant Other] : lives with significant other [Employed] : employed [College] : College [] :  [# Of Children ___] : has [unfilled] children [Sexually Active] : sexually active [Feels Safe at Home] : Feels safe at home [Fully functional (bathing, dressing, toileting, transferring, walking, feeding)] : Fully functional (bathing, dressing, toileting, transferring, walking, feeding) [Fully functional (using the telephone, shopping, preparing meals, housekeeping, doing laundry, using] : Fully functional and needs no help or supervision to perform IADLs (using the telephone, shopping, preparing meals, housekeeping, doing laundry, using transportation, managing medications and managing finances) [Smoke Detector] : smoke detector [Seat Belt] :  uses seat belt [Audit-CScore] : 1 [de-identified] : Works out in gym [[id number]] : 0 [Change in mental status noted] : No change in mental status noted [Reports changes in vision] : Reports no changes in vision [Reports changes in dental health] : Reports no changes in dental health [PapSmearDate] : 5/2024

## 2024-06-07 NOTE — PHYSICAL EXAM
[No Acute Distress] : no acute distress [Well Nourished] : well nourished [Well Developed] : well developed [Well-Appearing] : well-appearing [No JVD] : no jugular venous distention [No Lymphadenopathy] : no lymphadenopathy [Supple] : supple [Thyroid Normal, No Nodules] : the thyroid was normal and there were no nodules present [Normal Posterior Cervical Nodes] : no posterior cervical lymphadenopathy [Normal Anterior Cervical Nodes] : no anterior cervical lymphadenopathy [No CVA Tenderness] : no CVA  tenderness [No Spinal Tenderness] : no spinal tenderness [No Joint Swelling] : no joint swelling [Grossly Normal Strength/Tone] : grossly normal strength/tone [No Rash] : no rash [Normal] : normal gait, coordination grossly intact, no focal deficits and deep tendon reflexes were 2+ and symmetric [Normal Affect] : the affect was normal [Normal Insight/Judgement] : insight and judgment were intact [de-identified] : mild fullness of neck

## 2024-06-10 PROBLEM — N92.6 IRREGULAR MENSES: Status: ACTIVE | Noted: 2023-03-16

## 2024-06-10 PROBLEM — E07.89 THYROID FULLNESS: Status: ACTIVE | Noted: 2022-12-14

## 2024-06-10 PROBLEM — L65.9 HAIR LOSS: Status: ACTIVE | Noted: 2023-03-16

## 2024-06-10 NOTE — ASSESSMENT
[FreeTextEntry1] : 27 yo F with PMH of GERD, IBS, here PCOS and hair thinning.  Planning ttc end of 2024 stop spironolactone 3 months before ttc, alternatively discussed 6 months off ocp/christoph to monitor cycles  Discussed teratogenicity of spironolactone.  #Hair thinning - at part/vertex, c/w female pattern hair loss, ddx includes hyperandrogenism, found to have elevated androstenedione.  - TFTs normal, PRL normal, 17oh p repeat/fasting was normal, no need for stim - 2023 androstenedione elevated - 2023 [facility] x2 was normal - recommended dermatology evaluation as well - on OCP + spironolactone 50 mg daily (not taking bid) - MNSC x 2 negative in 2023  #idiopathic hyperandrogenism vs. pcos. PCOS suspected #irregular menses - had one late period, possibly in setting of stress. Clinically, sounds like ovulatory, regular cycles and does not meet NIH criteria at this time - pt will continue to track cycles - wants to ttc end of 2024 - 17oh P repeat fasting was normal in 3/2023 (no need to stim) - TFTs normal, PRL normal - MNSC negative x 2 3/2023 - now on ocp + spironolactone- Planning ttc end of 2024 stop spironolactone at least 3 months before ttc, alternatively discussed 6 months off ocp/christoph to monitor cycles  Discussed teratogenicity of spironolactone. This has also been discussed with her GYN -can consider OGTT at future date if fam hx of DM/metabolic syndrome  #Weight gain - discussed lifestyle - increased exercise, pt also working on mediating stress. Can try yoga, incorporating gentle aerobic exercise and escalate as tolerated. - MNSC negative x 2  rtc 6 months

## 2024-06-10 NOTE — HISTORY OF PRESENT ILLNESS
[FreeTextEntry1] : 29 yo F with PMH of GERD, IBS, here with concern for PCOS and hair thinning.  A1c was 5.4 -> 5.6%  Reports gaining weight in the last few years. Missed period in 11/2022, had longer cycle at 47 days. Saw GYN recently. Was concerned about PCOS. Had androgens sent. Has transvaginal US scheduled. Was never told she had PCOS previously.  Reports fine hair and 3 years of hair thinning and hair falling out more in the shower. Periods starting to be heavier.  Reports a lot of stressors. Not exercising as much as she used to.  Not trying for pregnancy now. Started to track cycles.   Menarche: 12 Menses per year: monthly cycles 32-35 days; due today, spotting  reports heavier periods  Never on OCP before [place]: Breast tenderness + before period by 2 weeks;	Weight gain/bloating + all the time, not just before period;  Mood changes + before period onset [facility] ethinicity Has done dermoplaning; feels like extra hair on face; no dark terminal hair; some faint hair over lip - weekly shave. Has some hair thinning at part and posterior scalp  Has never been on spironolactone or metformin.  Family History: no PCOS. Mom has hypothyroidism  No purple stretch marks. Gained 10 lbs since last year.   Works days at [employer] and the [employer].  12/2022 TSH 2.31, FT4 1.3  12/2022 thyroid US normal. no nodules  3/2023 MNSC normal x 2 8/2023 androstenedione elevated PRL normal 17oh p repeat/fasting was normal, no need for stim  in 8/2023 pt agreed to start ocp/spironlactone  BMI <25 did not start metformin can consider OGTT at future date if fam hx of DM/metabolic syndrome  5/2024 visit: thinking about pregnancy in the next year or so

## 2024-06-10 NOTE — PHYSICAL EXAM
[Alert] : alert [Well Nourished] : well nourished [No Acute Distress] : no acute distress [Well Developed] : well developed [Normal Sclera/Conjunctiva] : normal sclera/conjunctiva [EOMI] : extra ocular movement intact [No Proptosis] : no proptosis [Normal Oropharynx] : the oropharynx was normal [Supple] : the neck was supple [Thyroid Not Enlarged] : the thyroid was not enlarged [No Thyroid Nodules] : no palpable thyroid nodules [No Respiratory Distress] : no respiratory distress [No Accessory Muscle Use] : no accessory muscle use [Normal Rate and Effort] : normal respiratory rate and effort [Clear to Auscultation] : lungs were clear to auscultation bilaterally [Normal S1, S2] : normal S1 and S2 [Normal Rate] : heart rate was normal [Regular Rhythm] : with a regular rhythm [No Edema] : no peripheral edema [Pedal Pulses Normal] : the pedal pulses are present [Normal Bowel Sounds] : normal bowel sounds [Not Tender] : non-tender [Not Distended] : not distended [Soft] : abdomen soft [Normal Anterior Cervical Nodes] : no anterior cervical lymphadenopathy [No Spinal Tenderness] : no spinal tenderness [Spine Straight] : spine straight [No Stigmata of Cushings Syndrome] : no stigmata of Cushings Syndrome [Normal Gait] : normal gait [Normal Strength/Tone] : muscle strength and tone were normal [No Rash] : no rash [Normal Reflexes] : deep tendon reflexes were 2+ and symmetric [No Tremors] : no tremors [Oriented x3] : oriented to person, place, and time [Abdominal Striae] : no abdominal striae [Acanthosis Nigricans] : no acanthosis nigricans [Acne] : no acne [Hirsutism] : no hirsutism [de-identified] : hair thinning at part and thinning at vertex

## 2024-06-20 ENCOUNTER — APPOINTMENT (OUTPATIENT)
Dept: FAMILY MEDICINE | Facility: CLINIC | Age: 29
End: 2024-06-20

## 2024-07-13 ENCOUNTER — TRANSCRIPTION ENCOUNTER (OUTPATIENT)
Age: 29
End: 2024-07-13

## 2024-07-25 ENCOUNTER — TRANSCRIPTION ENCOUNTER (OUTPATIENT)
Age: 29
End: 2024-07-25

## 2024-08-21 ENCOUNTER — TRANSCRIPTION ENCOUNTER (OUTPATIENT)
Age: 29
End: 2024-08-21

## 2024-09-25 ENCOUNTER — APPOINTMENT (OUTPATIENT)
Dept: GASTROENTEROLOGY | Facility: CLINIC | Age: 29
End: 2024-09-25
Payer: COMMERCIAL

## 2024-09-25 VITALS
HEART RATE: 108 BPM | HEIGHT: 65 IN | DIASTOLIC BLOOD PRESSURE: 76 MMHG | WEIGHT: 146 LBS | OXYGEN SATURATION: 98 % | SYSTOLIC BLOOD PRESSURE: 122 MMHG | BODY MASS INDEX: 24.32 KG/M2

## 2024-09-25 DIAGNOSIS — K21.9 GASTRO-ESOPHAGEAL REFLUX DISEASE W/OUT ESOPHAGITIS: ICD-10-CM

## 2024-09-25 PROCEDURE — 99214 OFFICE O/P EST MOD 30 MIN: CPT

## 2024-09-25 PROCEDURE — G2211 COMPLEX E/M VISIT ADD ON: CPT

## 2024-09-25 RX ORDER — FAMOTIDINE 40 MG/1
40 TABLET, FILM COATED ORAL
Qty: 90 | Refills: 3 | Status: ACTIVE | COMMUNITY
Start: 2024-09-25 | End: 1900-01-01

## 2024-09-25 RX ORDER — OMEPRAZOLE 20 MG/1
20 CAPSULE, DELAYED RELEASE ORAL DAILY
Qty: 90 | Refills: 3 | Status: ACTIVE | COMMUNITY
Start: 2024-09-25 | End: 1900-01-01

## 2024-09-25 NOTE — HISTORY OF PRESENT ILLNESS
[FreeTextEntry1] : Dayami presents for follow-up visit.  She relates that last Thursday she developed heartburn symptoms with increased hiccups belching and burning in her chest.  She has been taking famotidine 20 mg daily.  She increased her famotidine to 2 tablets at 20 mg at night.  She states that it most likely was her diet triggering her reflux.  She improved slightly on Saturday but then on Saturday night had a wedding to go to and woke up on Sunday with sore throat.  She states that for most mornings she would have increased belching.  She denies dysphagia or odynophagia.  She denies abdominal pain, nausea or vomiting.  She denies changes in bowel habits.  She had gained almost 20 pounds since 2022.

## 2024-09-25 NOTE — ASSESSMENT
[FreeTextEntry1] : This is a 28-year-old female with chronic GERD.  I recommend eating small meals and avoid laying down after eating.  I recommend keeping the caffeine to 1 cup a day.  I recommend omeprazole 20 mg in the morning to be taken half hour to an hour before breakfast.  A prescription was sent to the pharmacy.  I recommend for now increasing the famotidine to 40 mg at bedtime.  When she feels better, she can stop the omeprazole and take it only as needed when she has symptoms of heartburn.  She has been doing well.  I would recommend she stays on the famotidine 40 mg at bedtime for maintenance.  She is to call me if no improvement after 2 weeks of the above combination at which time I will schedule her for an upper endoscopy.  I recommend weight loss.  She is to call me if she has questions or concerns.

## 2025-04-25 ENCOUNTER — APPOINTMENT (OUTPATIENT)
Dept: HUMAN REPRODUCTION | Facility: CLINIC | Age: 30
End: 2025-04-25
Payer: COMMERCIAL

## 2025-04-25 PROCEDURE — 36415 COLL VENOUS BLD VENIPUNCTURE: CPT

## 2025-04-25 PROCEDURE — 99205 OFFICE O/P NEW HI 60 MIN: CPT | Mod: 25

## 2025-04-25 PROCEDURE — 76830 TRANSVAGINAL US NON-OB: CPT

## 2025-06-25 ENCOUNTER — APPOINTMENT (OUTPATIENT)
Dept: HUMAN REPRODUCTION | Facility: CLINIC | Age: 30
End: 2025-06-25

## 2025-06-25 PROCEDURE — 76817 TRANSVAGINAL US OBSTETRIC: CPT

## 2025-06-25 PROCEDURE — 99214 OFFICE O/P EST MOD 30 MIN: CPT | Mod: 25

## 2025-08-19 ENCOUNTER — APPOINTMENT (OUTPATIENT)
Dept: FAMILY MEDICINE | Facility: CLINIC | Age: 30
End: 2025-08-19